# Patient Record
Sex: MALE | URBAN - METROPOLITAN AREA
[De-identification: names, ages, dates, MRNs, and addresses within clinical notes are randomized per-mention and may not be internally consistent; named-entity substitution may affect disease eponyms.]

---

## 2019-12-06 ENCOUNTER — TELEPHONE (OUTPATIENT)
Dept: FAMILY MEDICINE | Facility: CLINIC | Age: 44
End: 2019-12-06

## 2019-12-06 NOTE — TELEPHONE ENCOUNTER
Reason for call:  Other   Patient called regarding (reason for call): Order receiveed was for a new pt office visit and Pt was under the impression it would be for a procedure? [facility] says given the symptoms that an upper endoscopy order would fit.     Additional comments: Please call     Phone number to reach patient:  Other phone number:  [phone]    Best Time:  any    Can we leave a detailed message on this number?  YES     Estelle Love on 12/6/2019 at 1:20 PM